# Patient Record
Sex: MALE | URBAN - METROPOLITAN AREA
[De-identification: names, ages, dates, MRNs, and addresses within clinical notes are randomized per-mention and may not be internally consistent; named-entity substitution may affect disease eponyms.]

---

## 2023-11-16 ENCOUNTER — TELEPHONE (OUTPATIENT)
Dept: GASTROENTEROLOGY | Facility: CLINIC | Age: 28
End: 2023-11-16

## 2023-11-16 NOTE — TELEPHONE ENCOUNTER
Returned call to the patient, patient states that he was referred to Dr. Gutierrez by Dr. Duarte however states that he no longer has any insurance.  Patient was advised of [facility] or [facility] in [place] for care.

## 2023-11-16 NOTE — TELEPHONE ENCOUNTER
----- Message from Marissa Valladares sent at 11/16/2023 10:53 AM CST -----  Regarding: ret call  Contact: Blas [phone]  Type:  Patient Returning Call    Who Called:  Blas    Who Left Message for Patient:  Courtney    Does the patient know what this is regarding?:  yes    Best Call Back Number:  [phone]    Additional Information:  missed call, please call back